# Patient Record
Sex: MALE | RURAL
[De-identification: names, ages, dates, MRNs, and addresses within clinical notes are randomized per-mention and may not be internally consistent; named-entity substitution may affect disease eponyms.]

---

## 2023-05-17 ENCOUNTER — TELEPHONE (OUTPATIENT)
Dept: CARDIOLOGY | Facility: CLINIC | Age: 58
End: 2023-05-17

## 2023-05-17 NOTE — TELEPHONE ENCOUNTER
PATIENT CALLED AND LEFT A VOICEMAIL WITH QUESTIONS REGARDING HIS CPAP. PLEASE CALL BACK [phone].

## 2023-05-18 NOTE — TELEPHONE ENCOUNTER
Pt seen by Karena on 1/31/2023 for new dx JOSE LUIS - AHI 26 with oxygen desats. Started on AutoCPAP 4-20cm;FLEX 3.  Pt is currently in [place] and won't be back in [place] until August or September.  He states that the high end of pressure is too much and wants this reduced.  Is 75% better with CPAP. Related the need for us to have a 31-90 day download.  I've called [facility] to have them Fax a 31 and 90 day download. Have not received this at this time.  Please advise.

## 2023-05-19 DIAGNOSIS — G47.33 OBSTRUCTIVE SLEEP APNEA: Primary | ICD-10-CM

## 2023-05-19 NOTE — TELEPHONE ENCOUNTER
Order placed to change pressures to 6/14.  Sending to [facility] in [place] and [facility] different.

## 2023-06-14 ENCOUNTER — TELEPHONE (OUTPATIENT)
Dept: CARDIOLOGY | Facility: CLINIC | Age: 58
End: 2023-06-14

## 2023-06-14 NOTE — TELEPHONE ENCOUNTER
Caller: Oc Espinoza    Relationship: Self    Best call back number: [phone]    What was the call regarding: PATIENT RETURNING MISSED CALL TO [facility]. UNABLE TO WARM TRANSFER.

## 2023-06-14 NOTE — TELEPHONE ENCOUNTER
MAIDA TEXTED A BIT AGO SAYING THEY WILL NEED A 31-90 DAY VISIT FROM HIM OTHERWISE THEY WILL HAVE TO SEND HIM A BILL.  PROBLEM IN THE PATIENT IS IN [facility] [place] FOR A MONTH AND A HALF AND THEN FLYING TO [facility] UNTIL THE NEW YEAR.  WANTED TO KNOW IF WE CAN DO A TELEHEALTH VISIT WITH HIM TO GET HIM GOOD WITH HIS CPAP COMPLIANCE VISIT.  PLEASE ADVISE?

## 2024-01-10 ENCOUNTER — TELEPHONE (OUTPATIENT)
Dept: CARDIOLOGY | Facility: CLINIC | Age: 59
End: 2024-01-10

## 2024-01-10 NOTE — TELEPHONE ENCOUNTER
Caller: Oc Espinoza    Relationship to patient: Self    Best call back number:[phone]    Chief complaint:     Type of visit: FOLLOW UP SLEEP APNEA    Requested date:  ANY DAY MORNING APPOINTMENT      Additional notes:PATIENT HAS BEEN OUT OF COUNTRY AND NEEDS A FOLLOW UP APPOINTMENT.. NO SCHEDULING DIRECTION . PLEASE REACH OUT TO PATIENT TO SCHEDULE

## 2024-01-15 ENCOUNTER — OFFICE VISIT (OUTPATIENT)
Dept: CARDIOLOGY | Facility: CLINIC | Age: 59
End: 2024-01-15
Payer: COMMERCIAL

## 2024-01-15 VITALS
OXYGEN SATURATION: 96 % | BODY MASS INDEX: 32.96 KG/M2 | WEIGHT: 210 LBS | HEIGHT: 67 IN | HEART RATE: 79 BPM | SYSTOLIC BLOOD PRESSURE: 112 MMHG | DIASTOLIC BLOOD PRESSURE: 78 MMHG

## 2024-01-15 DIAGNOSIS — G47.33 OSA (OBSTRUCTIVE SLEEP APNEA): Primary | ICD-10-CM

## 2024-01-15 RX ORDER — AMLODIPINE BESYLATE 5 MG/1
5 TABLET ORAL DAILY
COMMUNITY

## 2024-01-15 RX ORDER — HYDROCHLOROTHIAZIDE 12.5 MG/1
12.5 CAPSULE, GELATIN COATED ORAL EVERY MORNING
COMMUNITY

## 2024-01-15 RX ORDER — ATORVASTATIN CALCIUM 40 MG/1
TABLET, FILM COATED ORAL
COMMUNITY

## 2024-01-15 RX ORDER — LOSARTAN POTASSIUM 100 MG/1
100 TABLET ORAL DAILY
COMMUNITY

## 2024-01-15 NOTE — PROGRESS NOTES
Follow-Up Sleep Consult     Date:   01/15/2024  Name: Oc Espinoza  :   1965  PCP: Ju Garcia    Chief Complaint   Patient presents with    Sleep Apnea       Subjective     History of Present Illness  Oc Espinoza is a 58 y.o. male who presents today for follow-up on JOSE LUIS.  Patient is here today to follow up with his sleep apnea.  He states he is having issues with his humidification.  Sometimes he wakes up with water all in his mask.  He states that his machine is sitting higher than were he is sleeping.  He will now make the necessary adjustments to prevent further water from running back into his mask.  He also states he is wanting his pressure adjusted back to 4cm.  He has been out of the country during the holidays and took his pap machine with him.  He says he has not been as consistent as he first was.  He does report since using the pap machine he is not having the headaches like he was.  He states he is receiving benefit from pap therapy. Encouraged patient to increase usage.      History JOSE LUIS:    1/10/23 Baseline JOSE LUIS 26 in supine 35    Current settings 6-14cm      Device Download  AHI on download is 2.  Compliance on download is 43%.  When used >4 hours is 30%.   Average use per night is 30%.    Current mask used is FFM                        The patient's relevant past medical, surgical, family, and social history reviewed and updated in Epic as appropriate.    Past Medical History:   Diagnosis Date    Hyperlipidemia     Hypertension     JOSE LUIS (obstructive sleep apnea)     AHI 25     Past Surgical History:   Procedure Laterality Date    EYE SURGERY         No Known Allergies  Prior to Admission medications    Medication Sig Start Date End Date Taking? Authorizing Provider   amLODIPine (NORVASC) 5 MG tablet Take 1 tablet by mouth Daily.   Yes Provider, MD Lizbeth   atorvastatin (LIPITOR) 40 MG tablet    Yes Provider, MD Lizbeth   hydroCHLOROthiazide (MICROZIDE) 12.5 MG capsule  "Take 1 capsule by mouth Every Morning.   Yes Provider, MD Lizbeth   losartan (COZAAR) 100 MG tablet Take 1 tablet by mouth Daily.   Yes Provider, MD Lizbeth     Family History   Problem Relation Age of Onset    Cancer Mother     Heart failure Father     Stroke Brother        Objective     Vital Signs:  /78   Pulse 79   Ht 170.2 cm (67\")   Wt 95.3 kg (210 lb)   SpO2 96%   BMI 32.89 kg/m²     BMI is >= 30 and <35. (Class 1 Obesity). The following options were offered after discussion;: weight loss educational material (shared in after visit summary) and exercise counseling/recommendations        Physical Exam    The following data was reviewed by: MYLES Mi on 01/15/2024:    Thirty day download from 12/13/23 to 1/11/24 data from download has been reviewed and interpreted.  Patient has sub-optimal compliance.  He has good control of his JOSE LUIS when he uses his pap machine.  Patient wishes to continue usage.  He is receiving benefit from pap therapy.  He reports better sleep and has not been having the headaches as he once did since he started on cpap.  Plan to continue current treatment.         Assessment and Plan     Diagnoses and all orders for this visit:    1. JOSE LUIS (obstructive sleep apnea) (Primary)  -     PAP Therapy        Report if any new/changing symptoms immediately, Sleep risks reviewed (driving, medical, sleep death, sedating agents), and Sleep hygiene discussed         Follow Up  Return in about 3 months (around 4/15/2024) for Next scheduled follow up.  Patient was given instructions and counseling regarding his condition or for health maintenance advice. Please see specific information pulled into the AVS if appropriate.  "

## 2024-01-15 NOTE — ASSESSMENT & PLAN NOTE
Patient has a baseline AHI 26 which is moderate sleep apnea.  He has good control of his JOSE LUIS when he uses his pap machine.  Patient reports he is receiving benefit from pap therapy and wishes to continue current treatment.  He wants to see is we can turn his pressure adjusted back to 4cm.  As he feels sometimes when he puts his mask on the pressure is to strong.  He has also had issues with his humidification filling up in his mask.  Instructed patient to ensure his machine is sitting lower than his body during sleep.  He states he was out of the country during the month of December and [place] states.  He states he did take his pap machine with him and used it when he could.  Plan to continue current treatment.

## 2024-04-08 ENCOUNTER — OFFICE VISIT (OUTPATIENT)
Dept: CARDIOLOGY | Facility: CLINIC | Age: 59
End: 2024-04-08
Payer: COMMERCIAL

## 2024-04-08 VITALS
HEIGHT: 67 IN | HEART RATE: 61 BPM | SYSTOLIC BLOOD PRESSURE: 130 MMHG | WEIGHT: 196 LBS | DIASTOLIC BLOOD PRESSURE: 82 MMHG | OXYGEN SATURATION: 97 % | BODY MASS INDEX: 30.76 KG/M2

## 2024-04-08 DIAGNOSIS — G47.33 OSA (OBSTRUCTIVE SLEEP APNEA): Primary | Chronic | ICD-10-CM

## 2024-04-08 PROCEDURE — 1159F MED LIST DOCD IN RCRD: CPT | Performed by: NURSE PRACTITIONER

## 2024-04-08 PROCEDURE — 1160F RVW MEDS BY RX/DR IN RCRD: CPT | Performed by: NURSE PRACTITIONER

## 2024-04-08 PROCEDURE — 99213 OFFICE O/P EST LOW 20 MIN: CPT | Performed by: NURSE PRACTITIONER

## 2024-04-08 RX ORDER — HYDROCHLOROTHIAZIDE 12.5 MG/1
TABLET ORAL
COMMUNITY
Start: 2024-03-19 | End: 2024-04-08 | Stop reason: ALTCHOICE

## 2024-04-08 NOTE — PROGRESS NOTES
Follow-Up Sleep Consult     Date:   2024  Name: Oc Espinoza  :   1965  PCP: Ju Garcia    Chief Complaint   Patient presents with    Follow-up    Sleep Apnea       Subjective     History of Present Illness  Oc Espinoza is a 59 y.o. male who presents today for follow-up on JOSE LUIS.    He states he continues to have insomnia he also has chronic pain, but feels his sleep is more rested since being on his pap machine consistently.  He states since he has gotten more consistent with his PAP therapy he has not had the migraines or headaches like he used to.  He is taken a more holistic approach to his health care, and is no longer taking any of his blood pressure or cholesterol medicines.  His PCP is keeping close track of his lab work and testing.  Patient states that his sleep is more rested and he is not as sleepy during the day.  Airflow and mask fit are comfortable.  Download shows good compliance and control.  Plan to continue current treatment.  Patient to report any change in symptoms.    History JOSE LUIS:    1/10/23 Baseline JOSE LUIS 26 in supine 35    Current settings 6-14cm    Current mask used is FFM    Device Functioning Well: Yes  Mask Fit Comfortable: Yes  Air Flow Comfortable: Yes  DME Helpful for Supplies: Yes  Sleep is rested: Yes        Device Download:                         The patient's relevant past medical, surgical, family, and social history reviewed and updated in Epic as appropriate.    Past Medical History:   Diagnosis Date    Hyperlipidemia     Hypertension     JOSE LUIS (obstructive sleep apnea)     AHI 25     Past Surgical History:   Procedure Laterality Date    EYE SURGERY         No Known Allergies  Prior to Admission medications    Medication Sig Start Date End Date Taking? Authorizing Provider   hydroCHLOROthiazide 12.5 MG tablet  3/19/24 4/8/24 Yes Lizbeth Bronson MD   amLODIPine (NORVASC) 5 MG tablet Take 1 tablet by mouth Daily.  24  Lizbeth Bronson MD  "  atorvastatin (LIPITOR) 40 MG tablet   4/8/24  Provider, MD Lizbeth   hydroCHLOROthiazide (MICROZIDE) 12.5 MG capsule Take 1 capsule by mouth Every Morning.  4/8/24  Lizbeth Bronson MD   losartan (COZAAR) 100 MG tablet Take 1 tablet by mouth Daily.  4/8/24  Aiyana, MD Lizbeth     Family History   Problem Relation Age of Onset    Cancer Mother     Heart failure Father     Stroke Brother        Objective     Vital Signs:  /82   Pulse 61   Ht 170.2 cm (67\")   Wt 88.9 kg (196 lb)   SpO2 97%   BMI 30.70 kg/m²              Physical Exam    The following data was reviewed by: MYLES Mi on 04/08/2024:  MRI of neck and back reviewed.  30-day download 3/6/24 to 4/4/24 I have reviewed and interpreted the data.  Download shows good compliance and control AHI 2.  Airflow and mask fit are comfortable.  Patient sleep is more rested since being consistent with his PAP therapy.  He continues to arreguin insomnia making it hard for him to fall asleep.  He also reports his migraines and headaches have improved.  Patient is receiving benefit from PAP therapy.  Plan to continue current treatment.         Assessment and Plan     Diagnoses and all orders for this visit:    1. JOSE LUIS (obstructive sleep apnea) (Primary)  Assessment & Plan:  Patient has a baseline AHI of 26 that increases in supine to 35.  This is moderate to severe sleep apnea.  Download shows good compliance and control.  Mask fit and airflow are comfortable.  He states that he is not having the headaches like he used to.  He continues to have arreguin insomnia but his sleep feels more rested since being on PAP therapy.  He states he has been doing intermittent  fasting and his PCP has been keeping close watch on his labs.  He is no longer on his blood pressure medications or statin.  Patient is receiving benefit from PAP therapy plan to continue current treatment.          Report if any new/changing symptoms immediately, Sleep risks " reviewed (driving, medical, sleep death, sedating agents), and Sleep hygiene discussed         Follow Up  Return in about 1 year (around 4/8/2025) for Next scheduled follow up.  Patient was given instructions and counseling regarding his condition or for health maintenance advice. Please see specific information pulled into the AVS if appropriate.

## 2024-04-08 NOTE — ASSESSMENT & PLAN NOTE
Patient has a baseline AHI of 26 that increases in supine to 35.  This is moderate to severe sleep apnea.  Download shows good compliance and control.  Mask fit and airflow are comfortable.  He states that he is not having the headaches like he used to.  He continues to have arreguin insomnia but his sleep feels more rested since being on PAP therapy.  He states he has been doing intermittent  fasting and his PCP has been keeping close watch on his labs.  He is no longer on his blood pressure medications or statin.  Patient is receiving benefit from PAP therapy plan to continue current treatment.

## 2025-04-07 ENCOUNTER — PATIENT ROUNDING (BHMG ONLY) (OUTPATIENT)
Dept: CARDIOLOGY | Facility: CLINIC | Age: 60
End: 2025-04-07

## 2025-04-07 ENCOUNTER — OFFICE VISIT (OUTPATIENT)
Dept: CARDIOLOGY | Facility: CLINIC | Age: 60
End: 2025-04-07
Payer: COMMERCIAL

## 2025-04-07 VITALS
HEIGHT: 67 IN | HEART RATE: 72 BPM | SYSTOLIC BLOOD PRESSURE: 114 MMHG | DIASTOLIC BLOOD PRESSURE: 74 MMHG | WEIGHT: 200.5 LBS | OXYGEN SATURATION: 99 % | BODY MASS INDEX: 31.47 KG/M2

## 2025-04-07 DIAGNOSIS — G47.33 OSA (OBSTRUCTIVE SLEEP APNEA): Primary | ICD-10-CM

## 2025-04-07 PROCEDURE — 99213 OFFICE O/P EST LOW 20 MIN: CPT | Performed by: NURSE PRACTITIONER

## 2025-04-07 PROCEDURE — 1159F MED LIST DOCD IN RCRD: CPT | Performed by: NURSE PRACTITIONER

## 2025-04-07 PROCEDURE — 1160F RVW MEDS BY RX/DR IN RCRD: CPT | Performed by: NURSE PRACTITIONER

## 2025-04-07 RX ORDER — DIPHENHYDRAMINE HCL 50 MG/1
50 CAPSULE ORAL NIGHTLY PRN
COMMUNITY
Start: 2025-01-16

## 2025-04-07 RX ORDER — MONTELUKAST SODIUM 10 MG/1
10 TABLET ORAL DAILY
COMMUNITY
Start: 2025-03-31

## 2025-04-07 RX ORDER — LEVOCETIRIZINE DIHYDROCHLORIDE 5 MG/1
5 TABLET, FILM COATED ORAL EVERY EVENING
COMMUNITY
Start: 2025-03-31

## 2025-04-07 RX ORDER — FAMOTIDINE 20 MG/1
20 TABLET, FILM COATED ORAL 2 TIMES DAILY
COMMUNITY
Start: 2024-10-24

## 2025-04-07 NOTE — ASSESSMENT & PLAN NOTE
Patient has a baseline AHI of 26 that increases to 35 in supine. This is moderate to severe sleep apnea.  Download shows suboptimal compliance with good control.  Mask fit and airflow are comfortable.  Patient states he started having allergic reactions on his face and the mask was causing issues.  He states he was out in [place] and his doctor out there gave him extra medicine to help with the rash and he has just recently started using his device again.  Patient states he continues to struggle with his weight and his A1c.  Patient is receiving benefit from PAP therapy.  Plan to continue current treatment.    Prescription sent to DME of patient choice for PAP supplies.    Follow-up in 1 year or sooner for any JOSE LUIS or PAP concerns.

## 2025-04-07 NOTE — PROGRESS NOTES
Follow-Up Sleep Consult     Date:   2025  Name: Oc Espinoza  :   1965  PCP: Ju Garcia    Chief Complaint   Patient presents with    Follow-up     1 year JOSE LUIS follow up       Subjective     History of Present Illness  Oc Espinoza is a 60 y.o. male who presents today for follow-up on JOSE LUIS.    Patient states that he has been having issues with his allergies again and was having facial rash that would not go away.  He had to stop using his PAP device.  He states he saw his doctor out in [place] and he was given additional medication for his allergies.  He states he has recently started using his pap device again.  He states he mask fit and airflow are comfortable.  He does reports that the air does wake him up and seem to be really high when it does.  We have discussed to auto adjust and he only get the amount of air needed.  If he is having an apnea and the device feels the resistance it will force more air to help open the airway.  Patient states he did not know that the device would do that.  Patient has been encouraged to increase usage of his pap therapy.    History JOSE LUIS:    1/10/23 Baseline JOSE LUIS 26 in supine 35    Current settings 6-14cm      Current mask used is FFM    Device Functioning Well: Yes  Mask Fit Comfortable: Yes  Air Flow Comfortable: Yes  DME Helpful for Supplies: Yes  Sleep is rested: No, Pain interrupts sleep and Frequent awakenings         Device Download:                         The patient's relevant past medical, surgical, family, and social history reviewed and updated in Epic as appropriate.    Past Medical History:   Diagnosis Date    Allergies     Hyperlipidemia     Hypertension     JOSE LUIS (obstructive sleep apnea)     AHI 25     Past Surgical History:   Procedure Laterality Date    EYE SURGERY         Allergies   Allergen Reactions    Molds & Smuts Hives     Prior to Admission medications    Medication Sig Start Date End Date Taking? Authorizing Provider  "  Banophen 50 MG capsule Take 1 capsule by mouth At Night As Needed for Allergies. 1/16/25  Yes Provider, MD Libzeth   famotidine (PEPCID) 20 MG tablet Take 1 tablet by mouth 2 (Two) Times a Day. 10/24/24  Yes Provider, MD Lizbeth   levocetirizine (XYZAL) 5 MG tablet Take 1 tablet by mouth Every Evening. 3/31/25  Yes ProviderLizbeth MD   montelukast (SINGULAIR) 10 MG tablet Take 1 tablet by mouth Daily. 3/31/25  Yes Provider, Lizbeth, MD     Family History   Problem Relation Age of Onset    Cancer Mother     Heart failure Father     Stroke Brother        Objective     Vital Signs:  /74 (BP Location: Left arm, Patient Position: Sitting, Cuff Size: Large Adult)   Pulse 72   Ht 170.2 cm (67\")   Wt 90.9 kg (200 lb 8 oz)   SpO2 99%   BMI 31.40 kg/m²     BMI is >= 30 and <35. (Class 1 Obesity). The following options were offered after discussion;: exercise counseling/recommendations and nutrition counseling/recommendations        Physical Exam  Constitutional:       Appearance: Normal appearance. He is obese.   Neurological:      General: No focal deficit present.      Mental Status: He is alert and oriented to person, place, and time.   Psychiatric:         Mood and Affect: Mood normal.         Behavior: Behavior normal.         Thought Content: Thought content normal.         Judgment: Judgment normal.         The following data was reviewed by: MYLES Mi on 04/07/2025:    30-day download 3/3/25 to 4/1/25 I have reviewed and interpreted the data from the download.         Assessment and Plan     Diagnoses and all orders for this visit:    1. JOSE LUIS (obstructive sleep apnea) (Primary)  Assessment & Plan:  Patient has a baseline AHI of 26 that increases to 35 in supine. This is moderate to severe sleep apnea.  Download shows suboptimal compliance with good control.  Mask fit and airflow are comfortable.  Patient states he started having allergic reactions on his face and the mask was " causing issues.  He states he was out in [place] and his doctor out there gave him extra medicine to help with the rash and he has just recently started using his device again.  Patient states he continues to struggle with his weight and his A1c.  Patient is receiving benefit from PAP therapy.  Plan to continue current treatment.    Prescription sent to DME of patient choice for PAP supplies.    Follow-up in 1 year or sooner for any JOSE LUIS or PAP concerns.    Orders:  -     Cancel: PAP Therapy  -     PAP Therapy        Report if any new/changing symptoms immediately, Sleep risks reviewed (driving, medical, sleep death, sedating agents), Sleep hygiene discussed, and Increase pap therapy usage         Follow Up  Return in about 1 year (around 4/7/2026) for Yearly JOSE LUIS.  Patient was given instructions and counseling regarding his condition or for health maintenance advice. Please see specific information pulled into the AVS if appropriate.

## 2025-04-07 NOTE — PROGRESS NOTES
..My name is Rosa Maria Brian and I am the Practice Manager for [facility] [place].    I would like to thank you for being a loyal patient. If you do not mind I would like to ask you a few questions about your recent visit with us.  Please feel free to reply if you wish to provide us with feedback on your first visit with our practice.    First, could you tell me what went well with your recent visit?    Secondly, we are always looking for ways to make our patients' experiences even better.  Do you have any recommendations on ways we may improve?    Finally, overall were you satisfied with your first visit to us as a [facility] facility?    In the next few days, you will be receiving a Patient Experience Survey.      Thank you for taking the time to answer a few questions today.  I hope you have a good day.